# Patient Record
Sex: FEMALE | Race: ASIAN | NOT HISPANIC OR LATINO | URBAN - METROPOLITAN AREA
[De-identification: names, ages, dates, MRNs, and addresses within clinical notes are randomized per-mention and may not be internally consistent; named-entity substitution may affect disease eponyms.]

---

## 2018-10-02 ENCOUNTER — ANESTHESIA - HEALTHEAST (OUTPATIENT)
Dept: SURGERY | Facility: CLINIC | Age: 33
End: 2018-10-02

## 2018-10-02 ENCOUNTER — SURGERY - HEALTHEAST (OUTPATIENT)
Dept: SURGERY | Facility: CLINIC | Age: 33
End: 2018-10-02

## 2018-10-02 ASSESSMENT — MIFFLIN-ST. JEOR: SCORE: 1579.47

## 2018-10-06 ENCOUNTER — HOME CARE/HOSPICE - HEALTHEAST (OUTPATIENT)
Dept: HOME HEALTH SERVICES | Facility: HOME HEALTH | Age: 33
End: 2018-10-06

## 2021-06-02 VITALS — WEIGHT: 211 LBS | HEIGHT: 61 IN | BODY MASS INDEX: 39.84 KG/M2

## 2021-06-16 PROBLEM — K59.03 DRUG-INDUCED CONSTIPATION: Status: ACTIVE | Noted: 2018-10-06

## 2021-06-20 NOTE — ANESTHESIA CARE TRANSFER NOTE
Last vitals:   Vitals:    10/02/18 1533   BP: 102/62   Pulse: 86   Resp: 16   Temp: 36.4  C (97.6  F)   SpO2: 100%     Patient's level of consciousness is awake  Spontaneous respirations: yes  Maintains airway independently: yes  Dentition unchanged: yes  Oropharynx: oropharynx clear of all foreign objects    QCDR Measures:  ASA# 20 - Surgical Safety Checklist: WHO surgical safety checklist completed prior to induction  PQRS# 430 - Adult PONV Prevention: 4558F - Pt received => 2 anti-emetic agents (different classes) preop & intraop  ASA# 8 - Peds PONV Prevention: NA - Not pediatric patient, not GA or 2 or more risk factors NOT present  PQRS# 424 - Jennifer-op Temp Management: 4559F - At least one body temp DOCUMENTED => 35.5C or 95.9F within required timeframe  PQRS# 426 - PACU Transfer Protocol: - Transfer of care checklist used  ASA# 14 - Acute Post-op Pain: ASA14B - Patient did NOT experience pain >= 7 out of 10

## 2021-06-20 NOTE — ANESTHESIA POSTPROCEDURE EVALUATION
Patient: Cezar Dailey  REPEAT  SECTION, BILATERAL TUBAL LIGATION  Anesthesia type: spinal    Patient location: PACU  Last vitals:   Vitals:    10/02/18 1550   BP: 100/65   Pulse: 87   Resp: 16   Temp:    SpO2: 100%     Post vital signs: stable  Level of consciousness: awake and responds to simple questions  Post-anesthesia pain: pain controlled  Post-anesthesia nausea and vomiting: no  Pulmonary: unassisted, return to baseline  Cardiovascular: stable and blood pressure at baseline  Hydration: adequate  Anesthetic events: no    QCDR Measures:  ASA# 11 - Jennifer-op Cardiac Arrest: ASA11B - Patient did NOT experience unanticipated cardiac arrest  ASA# 12 - Jennifer-op Mortality Rate: ASA12B - Patient did NOT die  ASA# 13 - PACU Re-Intubation Rate: NA - No ETT / LMA used for case  ASA# 10 - Composite Anes Safety: ASA10A - No serious adverse event    Additional Notes:

## 2021-06-20 NOTE — ANESTHESIA PROCEDURE NOTES
Spinal Block    Patient location during procedure: OR  Start time: 10/2/2018 1:29 PM  End time: 10/2/2018 1:35 PM  Reason for block: primary anesthetic    Staffing:  Performing  Anesthesiologist: JIMMY MENDEZ    Preanesthetic Checklist  Completed: patient identified, risks, benefits, and alternatives discussed, timeout performed, consent obtained, airway assessed, oxygen available, suction available, emergency drugs available and hand hygiene performed  Spinal Block  Patient position: sitting  Prep: Betadine  Patient monitoring: heart rate, continuous pulse ox and blood pressure  Approach: midline  Location: L2-3  Injection technique: single-shot  Needle type: pencil-tip   Needle gauge: 25 G

## 2021-06-20 NOTE — ANESTHESIA PREPROCEDURE EVALUATION
Anesthesia Evaluation      Patient summary reviewed     Airway   Mallampati: II   Pulmonary - negative ROS                          Cardiovascular - negative ROS and normal exam   Neuro/Psych      Endo/Other    (+) obesity, pregnant     GI/Hepatic/Renal - negative ROS      Other findings: Hb 12.7      Dental - normal exam                        Anesthesia Plan  Planned anesthetic: spinal    ASA 2     Anesthetic plan and risks discussed with: patient    Post-op plan: routine recovery

## 2021-06-20 NOTE — ANESTHESIA PROCEDURE NOTES
Peripheral Block    Start time: 10/2/2018 3:14 PM  End time: 10/2/2018 3:21 PM  post-op analgesia per surgeon order as noted in medical record  Staffing:  Performing  Anesthesiologist: JIMMY MENDEZ  Preanesthetic Checklist  Completed: patient identified, site marked, risks, benefits, and alternatives discussed, timeout performed, consent obtained, airway assessed, oxygen available, suction available, emergency drugs available and hand hygiene performed  Peripheral Block  Block type: other, TAP  Prep: ChloraPrep  Patient position: supine  Patient monitoring: cardiac monitor, continuous pulse oximetry, heart rate and blood pressure  Laterality: bilateral, same technique used bilaterally  Injection technique: ultrasound guided    Ultrasound used to visualize needle placement in proximity to nerve being blocked: yes   Permanent ultrasound image captured for medical record  Sterile gel and probe cover used for ultrasound.    Needle  Needle type: echogenic   Needle gauge: 20G  Needle length: 6 in  no peripheral nerve catheter placed  Assessment  Injection assessment: no difficulty with injection, negative aspiration for heme, no paresthesia on injection and incremental injection